# Patient Record
Sex: FEMALE | Race: WHITE
[De-identification: names, ages, dates, MRNs, and addresses within clinical notes are randomized per-mention and may not be internally consistent; named-entity substitution may affect disease eponyms.]

---

## 2019-07-25 NOTE — DIAGNOSTIC IMAGING REPORT
Chest, 1 view,  7/25/2019.   

 



History: Chest pain.



Comparison: None available.



Findings: The cardiomediastinal silhouette and pulmonary vasculature are within

normal limits for a portable exam. There is no focal consolidation or pleural

effusion. Bilateral nipple piercings are present. There are no acute osseous or

soft tissue abnormalities. 



Impression: 

No acute cardiopulmonary abnormality.



Signed by: Loco Montoya on 7/25/2019 2:25 PM

## 2019-07-25 NOTE — XMS REPORT
Clinical Summary

                             Created on: 2019



Tyra Luna

External Reference #: [id number]

: 2000

Sex: Female



Demographics







                          Address                   [address] [place] [id number]

 

                          Home Phone                [phone]

 

                          Preferred Language        English

 

                          Marital Status            Unknown

 

                          Anglican Affiliation     Unknown

 

                          Race                      White

 

                          Ethnic Group              Non-





Author







                          Author                    [facility]

 

                          Address                   Unknown

 

                          Phone                     Unavailable







Support







                Name            Relationship    Address         Phone

 

                Aishwarya Huggins            Unknown         [phone]







Care Team Providers







                    Care Team Member Name    Role                Phone

 

                    Gina Fishman MD    PCP                 [phone]







Allergies

No Known Allergies



Medications







                          End Date                  Status



              Medication     Sig          Dispensed     Refills      Start  



                                         Date  

 

                                                    Active



                 metFORMIN (GLUCOPHAGE)     TK 1 T PO D      1               10/31/201  



                           500 MG tablet             8  

 

                                                    Active



                     spironolactone       2                   10/31/201  



                           (ALDACTONE) 100 MG tablet        8  

 

                                                    Active



                     mirtazapine (REMERON) 15     Take 15 mg by       0   



                           MG tablet                 mouth     



                                         nightly.     

 

                          2019                Discontinued



                 norethindrone (AYGESTIN)     TK 2 TS PO QD      4               10/31/201  



                     5 mg tablet         HS                  8  

 

                          2019                Discontinued



                     escitalopram oxalate     Take 10 mg by       0   



                           (LEXAPRO) 10 MG tablet     mouth as     



                                         needed.     

 

                          2019                Discontinued



              etonogestrel (NEXPLANON)     implanted.     1 each       0              



                           68 mg Impl                8  







Active Problems





No known active problems



Encounters







                          Care Team                 Description



                     Date                Type                Specialty  

 

                                        



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

 Dx)



                     2019          Office Visit        Internal Medicine  

 

                                        



Gina Fishman MD                    Results



                     2018          Telephone           Internal Medicine  

 

                                        



Gina Fishman MD                    Anxiety and depression (Primary Dx)



                     2018          Office Visit        Internal Medicine  



after 2018



Family History







   



                 Medical History     Relation        Name            Comments

 

   



                           Hypertension              Mother  

 

   



                           Heart disease             Sister  

 

   



                           Heart disease             Sister  









   



                 Relation        Name            Status          Comments

 

   



                           Father                     

 

   



                           Mother                    Alive 

 

   



                           Sister                    Alive 

 

   



                                         Sister   







Social History







                                        Date



                 Tobacco Use     Types           Packs/Day       Years Used 

 

                                         



                                         Never Smoker    

 

    



                                         Smokeless Tobacco: Never   



                                         Used   









   



                 Alcohol Use     Drinks/Week     oz/Week         Comments

 

   



                                         Yes   









  



                     Alcohol Habits      Answer              Date Recorded

 

  



                     How often do you have a drink containing alcohol?     Monthly or less     2018



 

  



                     How many drinks containing alcohol do you have on     1 or 2              2018



                                         a typical day when you are drinking?  

 

  



                     How often do you have six or more drinks on one     Less than monthly     2018





                                         occasion?  









 



                           Sex Assigned at Birth     Date Recorded

 

 



                                         Not on file 









                                        Industry



                           Job Start Date            Occupation 

 

                                        Not on file



                           Not on file               Not on file 









                                        Travel End



                           Travel History            Travel Start 

 





                                         No recent travel history available.







Last Filed Vital Signs







                                        Time Taken



                           Vital Sign                Reading 

 

                                        2018  8:58 AM CST



                           Blood Pressure            102/60 

 

                                        2018  8:58 AM CST



                           Pulse                     60 

 

                                        2018  8:58 AM CST



                           Temperature               36.9 C (98.4 F) 

 

                                        2019  9:11 AM CDT



                           Respiratory Rate          16 

 

                                        2018  8:58 AM CST



                           Oxygen Saturation         91% 

 

                                        -



                           Inhaled Oxygen            - 



                                         Concentration  

 

                                        2019  9:11 AM CDT



                           Weight                    49.3 kg (108 lb 11.2 oz) 

 

                                        2019  9:11 AM CDT



                           Height                    160 cm (5' 3") 

 

                                        2019  9:11 AM CDT



                           Body Mass Index           19.26 







Plan of Treatment





Not on file



Procedures







                                        Comments



                 Procedure Name     Priority        Date/Time       Associated Diagnosis 

 

                                        



Results for this procedure are in the results section.



                 TSH             Routine         2018      Anxiety and depression 



                                         9:33 AM CST  



after 2018



Results

* TSH (2018  9:33 AM CST)





   



                 Component       Value           Ref Range       Performed At

 

   



                 TSH             3.390           0.450 - 4.50 uIU/mL     [facility]













                                         Specimen

 





                                         Blood









 



                           Narrative                 Performed At

 

 



                           Performed at:[facility] [address] [facility]     [facility]



                                         [address], [id number] 



                                         : Israel López MD, Phone:[phone] 









   



                 Performing Organization     Address         City/State/Zipcode     Phone Number

 

   



                                         [facility]   

 

   



                                         [facility]   





after 2018



Insurance







     



            Payer      Benefit     Subscriber ID     Type       Phone      Address



                                         Plan /    



                                         Group    

 

     



                 [employer]     [employer] P[employer]       xxxxxxxxxxx     PPO  









     



            Guarantor Name     Account     Relation to     Date of     Phone      Billing Address



                     Type                Patient             Birth  

 

     



            Tyra Luna     Personal/F     Self       2000     [phone]     [address]               (Home)              [place] [address]

## 2019-07-25 NOTE — NUR
PATIENT ELOPED FROM LOBBY. OTHER PATIENT IN LOBBY STATED THAT THIS PATIENT HAD 
LEFT JUST PRIOR TO CALLING FOR PATIENT TO COME INTO TRIAGE.

## 2019-07-25 NOTE — XMS REPORT
Clinical Summary

                             Created on: 2019



Tyra Luna

External Reference #: [id number]

: 2000

Sex: Female



Demographics







                          Address                   [address]  [address]

 

                          Home Phone                [phone]

 

                          Preferred Language        English

 

                          Marital Status            Single

 

                          Jehovah's witness Affiliation     Unknown

 

                          Race                      White

 

                          Ethnic Group              Non-





Author







                          Author                    [facility]

 

                          Organization              [facility]

 

                          Address                   Unknown

 

                          Phone                     Unavailable







Support







                Name            Relationship    Address         Phone

 

                Aishwarya Huggins            Unknown         [phone]







Care Team Providers







                    Care Team Member Name    Role                Phone

 

                    Gina Fishman MD    PCP                 [phone]







Allergies

No Known Allergies



Medications







                          End Date                  Status



              Medication     Sig          Dispensed     Refills      Start  



                                         Date  

 

                          10/16/2018                Discontinued



                     escitalopram (LEXAPRO) 10     Take 10 mg by       0   



                           MG tablet                 mouth daily.     







Active Problems







 



                           Problem                   Noted Date

 

 



                           Endometriosis             10/16/2018







Encounters







                          Care Team                 Description



                     Date                Type                Specialty  

 

                                        



Maida Castro MD               



                     10/16/2018          Anesthesia          Obstetrics and Gynecology  



                                         Event   

 

                                        



Ene Lerma MD                      OPERATIVE LAPAROSCOPY AND HYSTEROSCOPY, CAUTERIZATION OF ENDO,

 ASPIRATION OF OVARIAN CYST



                     10/16/2018          Surgery             Obstetrics and Gynecology  

 

                                        



Ene Lerma MD                       



                     10/16/2018          Hospital            Obstetrics and Gynecology  



                                         Encounter   



after 2018



Social History







                                        Date



                 Tobacco Use     Types           Packs/Day       Years Used 

 

                                         



                                         Never Assessed    









 



                           Sex Assigned at Birth     Date Recorded

 

 



                                         Not on file 









                                        Industry



                           Job Start Date            Occupation 

 

                                        Not on file



                           Not on file               Not on file 









                                        Travel End



                           Travel History            Travel Start 

 





                                         No recent travel history available.







Last Filed Vital Signs







                                        Time Taken



                           Vital Sign                Reading 

 

                                        10/16/2018  3:00 PM CDT



                           Blood Pressure            114/72 

 

                                        10/16/2018  3:00 PM CDT



                           Pulse                     67 

 

                                        10/16/2018  4:00 PM CDT



                           Temperature               37 C (98.6 F) 

 

                                        10/16/2018  4:00 PM CDT



                           Respiratory Rate          15 

 

                                        10/16/2018  3:00 PM CDT



                           Oxygen Saturation         100% 

 

                                        -



                           Inhaled Oxygen            - 



                                         Concentration  

 

                                        10/16/2018 10:00 AM CDT



                           Weight                    54.6 kg (120 lb 4.8 oz) 

 

                                        10/16/2018 10:00 AM CDT



                           Height                    160 cm (5' 3") 

 

                                        10/16/2018 10:00 AM CDT



                           Body Mass Index           21.31 







Plan of Treatment





Not on file



Procedures







                                        Comments



                 Procedure Name     Priority        Date/Time       Associated Diagnosis 

 

                                         



                     NH AN ELECTIVE      Routine             10/16/2018  



                           ENDOTRACHEAL AIRWAY       1:05 PM CDT  

 

  



                                         Procedure



                                         Note -



                                         Leatha Holcomb,



                                         CRNA -



                                         10/16/2018



                                         1:05 PM



                                         CDT



                                         Airway



                                         Date/Time:



                                         10/16/2018



                                         12:42 PM



                                         Performed



                                         by:



                                         LEATHA HOLCOMB



                                         Authorized



                                         by:



                                         MAIDA CASTRO





                                         Location:



                                         OR



                                         Urgency:



                                         Elective



                                         Difficult



                                         Airway: No



                                         Performed



                                         by:



                                         resident/C



                                         RNA/AA



                                         Preoxygena



                                         marla with



                                         100% O2:



                                         Yes



                                         C-spine



                                         Precaution



                                         s



                                         Maintained



                                         Throughout



                                         : Yes



                                         Mask



                                         Ventilatio



                                         n:  Easy



                                         mask



                                         Final



                                         Airway



                                         Type:



                                         Endotrache



                                         al airway



                                         Final



                                         Endotrache



                                         al Airway:



                                         ETT



                                         Technique



                                         Used:



                                         Direct



                                         laryngosco



                                         py



                                         Devices/Me



                                         thods Used



                                         in



                                         Placement:



                                         Intubatin



                                         g stylet



                                         Insertion



                                         Site:



                                         Oral



                                         Blade



                                         Type:



                                         [place]



                                         Laryngosco



                                         pe



                                         Blade/Vide



                                         olaryngosc



                                         ope Blade



                                         Size:  2



                                         ETT Size



                                         (mm):  7.0



                                         Measured



                                         from:



                                         Lips



                                         ETT to



                                         Lips (cm):



                                         21



                                         Placement



                                         Verified



                                         by: CO2



                                         detection,



                                         direct



                                         visualizat



                                         ion and



                                         equal



                                         breath



                                         sounds



                                         Laryngosco



                                         pic view:



                                         Grade I -



                                         full view



                                         of glottis



                                         Rapid



                                         Sequence



                                         Induction



                                         (RSI): No





                                         Modified



                                         RSI: No



                                         Number of



                                         Attempts



                                         at



                                         Approach:



                                         1



 

                                         



                     LAPAROSCOPY, DIAGNOSTIC      10/16/2018          Pelvic pain in female 



                                         12:33 PM CDT  

 

  



                                         Case Notes



                                         REQ 1200



                                         START

 

  



                                         Special



                                         Needs



                                         REQ 1200



                                         START

 

                                        



Results for this procedure are in the results section.



                     ESTIMATED GFR       STAT                10/16/2018  



                                         11:05 AM CDT  

 

                                        



Results for this procedure are in the results section.



                     TYPE AND SCREEN     STAT                10/16/2018  



                                         11:05 AM CDT  

 

                                        



Results for this procedure are in the results section.



                     BASIC METABOLIC PANEL     STAT                10/16/2018  



                                         11:05 AM CDT  

 

                                        



Results for this procedure are in the results section.



                     HC COMPLETE BLD COUNT     STAT                10/16/2018  



                           W/AUTO DIFF               11:05 AM CDT  

 

                                        



Results for this procedure are in the results section.



                     GFR CALCULATION     STAT                10/16/2018  



                                         10:48 AM CDT  



after 2018



Results

* Estimated GFR (10/16/2018 11:05 AM CDT)





    



              Component     Value        Ref Range     Performed At     Pathologist



                                         Signature

 

    



                 Estimated GFR     >=90            mL/min/1.73 m2     [facility] DEPARTMENT 



                           Comment:                  OF PATHOLOGY 



                           CatergoryUnitsInte      AND GENOMIC 



                           rpretation                MEDICINE 



                                         G1   



                                         >=90 Normal or high   



                                         G2   



                                         60-89Mildly decreased   



                                         V1f82-91   



                                         Mildly to moderately   



                                         decreased   



                                         [id number]   



                                         Moderately to severely   



                                         decreased   



                                         G4   



                                         15-29Severely   



                                         decreased   



                                         G5   



                                         <15Kidney failure   



                                         The eGFR was calculated using   



                                         the Chronic Kidney Disease   



                                         Epidemiology Collaboration   



                                         (CKD-EPI) equation.   



                                         Interpretation is based on   



                                         recommendations of the   



                                         National Kidney   



                                         Foundation-Kidney Disease   



                                         Outcomes Quality   



                                         Initiative (NKF-KDOQI)   



                                         published in 2014.   













                                         Specimen

 





                                         Plasma specimen









   



                 Performing Organization     Address         City/State/Zipcode     Phone Number

 

   



                     [facility] DEPARTMENT OF     [address] [place] [address] 



                                         PATHOLOGY AND GENOMIC   



                                         MEDICINE   





* CBC with platelet and differential (10/16/2018 11:05 AM CDT)





    



              Component     Value        Ref Range     Performed At     Pathologist



                                         Signature

 

    



                 WBC             5.05            4.50 - 11.00 k/uL     [facility] DEPARTMENT 



                                         OF PATHOLOGY 



                                         AND GENOMIC 



                                         MEDICINE 

 

    



                 RBC             4.69            4.20 - 5.50 m/uL     [facility] DEPARTMENT 



                                         OF PATHOLOGY 



                                         AND GENOMIC 



                                         MEDICINE 

 

    



                 HGB             13.7            12.0 - 16.0 g/dL     [facility] DEPARTMENT 



                                         OF PATHOLOGY 



                                         AND GENOMIC 



                                         MEDICINE 

 

    



                 HCT             39.9            37.0 - 47.0 %     [facility] DEPARTMENT 



                                         OF PATHOLOGY 



                                         AND GENOMIC 



                                         MEDICINE 

 

    



                 MCV             85.1            82.0 - 100.0 fL     [facility] DEPARTMENT 



                                         OF PATHOLOGY 



                                         AND GENOMIC 



                                         MEDICINE 

 

    



                 MCH             29.2            27.0 - 34.0 pg     [facility] DEPARTMENT 



                                         OF PATHOLOGY 



                                         AND GENOMIC 



                                         MEDICINE 

 

    



                 MCHC            34.3            31.0 - 37.0 g/dL     [facility] DEPARTMENT 



                                         OF PATHOLOGY 



                                         AND GENOMIC 



                                         MEDICINE 

 

    



                 RDW - SD        37.3            37.0 - 55.0 fL     [facility] DEPARTMENT 



                                         OF PATHOLOGY 



                                         AND GENOMIC 



                                         MEDICINE 

 

    



                 MPV             11.4            8.8 - 13.2 fL     [facility] DEPARTMENT 



                                         OF PATHOLOGY 



                                         AND GENOMIC 



                                         MEDICINE 

 

    



                 Platelet count     169             150 - 400 k/uL     [facility] DEPARTMENT 



                                         OF PATHOLOGY 



                                         AND GENOMIC 



                                         MEDICINE 

 

    



                 Nucleated RBC     0.00            /100 WBC        [facility] DEPARTMENT 



                                         OF PATHOLOGY 



                                         AND GENOMIC 



                                         MEDICINE 

 

    



                 Neutrophils     46.0            39.0 - 69.0 %     [facility] DEPARTMENT 



                                         OF PATHOLOGY 



                                         AND GENOMIC 



                                         MEDICINE 

 

    



                 Lymphocytes     46.3 (H)        25.0 - 45.0 %     [facility] DEPARTMENT 



                                         OF PATHOLOGY 



                                         AND GENOMIC 



                                         MEDICINE 

 

    



                 Monocytes       6.1             0.0 - 10.0 %     [facility] DEPARTMENT 



                                         OF PATHOLOGY 



                                         AND GENOMIC 



                                         MEDICINE 

 

    



                 Eosinophils     1.0             0.0 - 5.0 %     [facility] DEPARTMENT 



                                         OF PATHOLOGY 



                                         AND GENOMIC 



                                         MEDICINE 

 

    



                 Basophils       0.4             0.0 - 1.0 %     [facility] DEPARTMENT 



                                         OF PATHOLOGY 



                                         AND GENOMIC 



                                         MEDICINE 

 

    



                 Immature        0.2Comment: "Immature     0.0 - 1.0 %     [facility] DEPARTMENT 



                     granulocytes        granulocytes"  (promyelocytes,      OF PATHOLOGY 



                           myelocytes, metamyelocytes)      AND GENOMIC 



                                         MEDICINE 













                                         Specimen

 





                                         Blood









   



                 Performing Organization     Address         City/[facility]/Zipcode     Phone Number

 

   



                     [facility] [place] [address] 



                                         PATHOLOGY AND GENOMIC   



                                         MEDICINE   





* Type and screen (10/16/2018 11:05 AM CDT)





    



              Component     Value        Ref Range     Performed At     Pathologist



                                         Signature

 

    



                     ABO grouping        A                   [facility] DEPARTMENT 



                                         OF PATHOLOGY 



                                         AND GENOMIC 



                                         MEDICINE 

 

    



                     Rh type             NEG                 [facility] DEPARTMENT 



                                         OF PATHOLOGY 



                                         AND GENOMIC 



                                         MEDICINE 

 

    



                     Antibody screen     NEG                 [facility] DEPARTMENT 



                           (gel)                     OF PATHOLOGY 



                                         AND GENOMIC 



                                         MEDICINE 













                                         Specimen

 





                                         Blood









   



                 Performing Organization     Address         City/[facility]/Zipcode     Phone Number

 

   



                     [facility] [place] [address] 



                                         PATHOLOGY AND [facility]   



                                         MEDICINE   





* Basic metabolic panel (10/16/2018 11:05 AM CDT)





    



              Component     Value        Ref Range     Performed At     Pathologist



                                         Signature

 

    



                 Sodium          139             135 - 148 mEq/L     [facility] DEPARTMENT 



                                         OF PATHOLOGY 



                                         AND GENOMIC 



                                         MEDICINE 

 

    



                 Potassium       4.2             3.5 - 5.0 mEq/L     [facility] DEPARTMENT 



                                         OF PATHOLOGY 



                                         AND GENOMIC 



                                         MEDICINE 

 

    



                 Chloride        107             98 - 112 mEq/L     [facility] DEPARTMENT 



                                         OF PATHOLOGY 



                                         AND GENOMIC 



                                         MEDICINE 

 

    



                 CO2             22 (L)          24 - 31 mEq/L     [facility] DEPARTMENT 



                                         OF PATHOLOGY 



                                         AND GENOMIC 



                                         MEDICINE 

 

    



                 Anion gap       10@ANIO         7 - 15 mEq/L     [facility] DEPARTMENT 



                                         OF PATHOLOGY 



                                         AND GENOMIC 



                                         MEDICINE 

 

    



                 BUN             7               6 - 20 mg/dL     [facility] [facility] DEPARTMENT 



                                         OF PATHOLOGY 



                                         AND GENOMIC 



                                         MEDICINE 

 

    



                 Creatinine      0.69            0.50 - 0.90 mg/dL     [facility] DEPARTMENT 



                                         OF PATHOLOGY 



                                         AND GENOMIC 



                                         MEDICINE 

 

    



                 Glucose         94              65 - 99 mg/dL     [facility] DEPARTMENT 



                                         OF PATHOLOGY 



                                         AND GENOMIC 



                                         MEDICINE 

 

    



                 Calcium         9.7             8.3 - 10.2 mg/dL     [facility] DEPARTMENT 



                                         OF PATHOLOGY 



                                         AND GENOMIC 



                                         MEDICINE 













                                         Specimen

 





                                         Plasma specimen









   



                 Performing Organization     Address         City[facility]     Phone Number

 

   



                     [facility] [place] [address] 



                                         PATHOLOGY AND GENOMIC   



                                         MEDICINE   





* GFR calculation (10/16/2018 10:48 AM CDT)





    



              Component     Value        Ref Range     Performed At     Pathologist



                                         Signature

 

    



                     GFR calculation     See BelowComment: GFR not      [facility] DEPARTMENT 



                           valid on patients less than 18      OF PATHOLOGY 



                           years of age.             AND GENOMIC 



                                         MEDICINE 













                                         Specimen

 





                                         Plasma specimen









   



                 Performing Organization     Address         City/[facility]/Zipcode     Phone Number

 

   



                     [facility] [place] [address] 



                                         PATHOLOGY AND GENOMIC   



                                         MEDICINE   





after 2018



Insurance







                                        Type



            Payer      Benefit     Subscriber ID     Effective     Phone      Address 



                           Plan /                    Dates   



                                         Group     

 

                                        [facility]



                 [place]GIE SERRANO OPEN      xxxxxxxxxxx     2018-P   



                           ACCESS/NET                resent   



                                         WORK     









     



            Guarantor Name     Account     Relation to     Date of     Phone      Billing Address



                     Type                Patient             Birth  

 

     



            Tyra Luna     Personal/F     Self       2000     [phone] [id number]               ([place])              [place] [address]







Advance Directives





Patient has advance care planning documents on file. For more information, lauren anglin contact:



Aguila Brito



[address] [place] [address]

## 2019-07-25 NOTE — NUR
PT MOTHER REQUESTING TO SPEAK WITH PATIENT RELATIONS FOR CONCERNS THAT PT WAS 
NOT SEEN BY A PHYSICIAN DURING THE VISIT AND NOT GIVEN A ROOM; STATES "I SEE 
Y'ALL HAVE OPEN ROOMS IN THE BACK, AND A DOCTOR DID NOT SEE HER", MOTHER 
REASSURED THAT MUNA GARCIA SAW PT DURING TRIAGE WHILE EKG WAS PERFORMED, 
INFORMED THAT PT HAS BEEN SEEN BASED ON ACUITY AND INTERVENTIONS AS RECOMMENED 
BY ENP HAVE BEEN IN PLACE, FURTHER INFORMED THAT PT IS READY FOR D/C AS HER 
CONDITION IS STABLE BASED ON DIAGNOSTICS ORDERED, MOTHER STATES "YOU CAN'T 
DISCHARGE HER WITHOUT A DOCTOR HAVING SEEN HER, ONLY NURSES SAW HER", ATTEMPTED 
TO REASSURE MOTHER AND DAUGHTER MULTIPLE TIMES, BUT UNABLE TO RESOLVE CONCERNS 
AT THIS TIME, INFORMED THAT MANAGER WOULD BE CALLED TO FURTHER EVALUATE THE 
CONCERNS, VERBALIZED UNDERSTANDING.

## 2022-06-23 ENCOUNTER — HOSPITAL ENCOUNTER (EMERGENCY)
Dept: HOSPITAL 88 - ER | Age: 22
Discharge: HOME | End: 2022-06-23
Payer: COMMERCIAL

## 2022-06-23 VITALS — SYSTOLIC BLOOD PRESSURE: 100 MMHG | DIASTOLIC BLOOD PRESSURE: 56 MMHG

## 2022-06-23 VITALS — BODY MASS INDEX: 16.83 KG/M2 | WEIGHT: 95 LBS | HEIGHT: 63 IN

## 2022-06-23 DIAGNOSIS — G83.84: ICD-10-CM

## 2022-06-23 DIAGNOSIS — R50.9: ICD-10-CM

## 2022-06-23 DIAGNOSIS — G51.0: Primary | ICD-10-CM

## 2022-06-23 DIAGNOSIS — F90.9: ICD-10-CM

## 2022-06-23 DIAGNOSIS — F41.9: ICD-10-CM

## 2022-06-23 DIAGNOSIS — G40.909: ICD-10-CM

## 2022-06-23 LAB
ALBUMIN SERPL-MCNC: 3.9 G/DL (ref 3.5–5)
ALBUMIN/GLOB SERPL: 1.2 {RATIO} (ref 0.8–2)
ALP SERPL-CCNC: 68 IU/L (ref 40–150)
ALT SERPL-CCNC: 12 IU/L (ref 0–55)
ANION GAP SERPL CALC-SCNC: 14.9 MMOL/L (ref 8–16)
BACTERIA URNS QL MICRO: (no result) /HPF
BASOPHILS # BLD AUTO: 0 10*3/UL (ref 0–0.1)
BASOPHILS NFR BLD AUTO: 0.3 % (ref 0–1)
BUN SERPL-MCNC: 10 MG/DL (ref 7–26)
BUN/CREAT SERPL: 11 (ref 6–25)
CALCIUM SERPL-MCNC: 9 MG/DL (ref 8.4–10.2)
CHLORIDE SERPL-SCNC: 111 MMOL/L (ref 98–107)
CLARITY UR: (no result)
CO2 SERPL-SCNC: 19 MMOL/L (ref 22–29)
COLOR UR: YELLOW
DEPRECATED NEUTROPHILS # BLD AUTO: 5.4 10*3/UL (ref 2.1–6.9)
DEPRECATED RBC URNS MANUAL-ACNC: (no result) /HPF (ref 0–5)
EOSINOPHIL # BLD AUTO: 0 10*3/UL (ref 0–0.4)
EOSINOPHIL NFR BLD AUTO: 0.4 % (ref 0–6)
EPI CELLS URNS QL MICRO: (no result) /LPF
ERYTHROCYTE [DISTWIDTH] IN CORD BLOOD: 15.2 % (ref 11.7–14.4)
GLOBULIN PLAS-MCNC: 3.3 G/DL (ref 2.3–3.5)
GLUCOSE SERPLBLD-MCNC: 99 MG/DL (ref 74–118)
HCT VFR BLD AUTO: 36.7 % (ref 34.2–44.1)
HGB BLD-MCNC: 11.8 G/DL (ref 12–16)
KETONES UR QL STRIP.AUTO: NEGATIVE
LEUKOCYTE ESTERASE UR QL STRIP.AUTO: NEGATIVE
LYMPHOCYTES # BLD: 1.5 10*3/UL (ref 1–3.2)
LYMPHOCYTES NFR BLD AUTO: 19.3 % (ref 18–39.1)
MCH RBC QN AUTO: 27 PG (ref 28–32)
MCHC RBC AUTO-ENTMCNC: 32.2 G/DL (ref 31–35)
MCV RBC AUTO: 84 FL (ref 81–99)
MONOCYTES # BLD AUTO: 0.6 10*3/UL (ref 0.2–0.8)
MONOCYTES NFR BLD AUTO: 8.3 % (ref 4.4–11.3)
MUCOUS THREADS URNS QL MICRO: (no result)
NEUTS SEG NFR BLD AUTO: 71.4 % (ref 38.7–80)
NITRITE UR QL STRIP.AUTO: NEGATIVE
PLATELET # BLD AUTO: 189 X10E3/UL (ref 140–360)
POTASSIUM SERPL-SCNC: 3.9 MMOL/L (ref 3.5–5.1)
PROT UR QL STRIP.AUTO: NEGATIVE
RBC # BLD AUTO: 4.37 X10E6/UL (ref 3.6–5.1)
SODIUM SERPL-SCNC: 141 MMOL/L (ref 136–145)
SP GR UR STRIP: 1.02 (ref 1.01–1.02)
UROBILINOGEN UR STRIP-MCNC: 0.2 MG/DL (ref 0.2–1)
WBC #/AREA URNS HPF: (no result) /HPF (ref 0–5)

## 2022-06-23 PROCEDURE — 87070 CULTURE OTHR SPECIMN AEROBIC: CPT

## 2022-06-23 PROCEDURE — 85025 COMPLETE CBC W/AUTO DIFF WBC: CPT

## 2022-06-23 PROCEDURE — 36415 COLL VENOUS BLD VENIPUNCTURE: CPT

## 2022-06-23 PROCEDURE — 93005 ELECTROCARDIOGRAM TRACING: CPT

## 2022-06-23 PROCEDURE — 81025 URINE PREGNANCY TEST: CPT

## 2022-06-23 PROCEDURE — 83518 IMMUNOASSAY DIPSTICK: CPT

## 2022-06-23 PROCEDURE — 80053 COMPREHEN METABOLIC PANEL: CPT

## 2022-06-23 PROCEDURE — 99284 EMERGENCY DEPT VISIT MOD MDM: CPT

## 2022-06-23 PROCEDURE — 70450 CT HEAD/BRAIN W/O DYE: CPT

## 2022-06-23 PROCEDURE — 81001 URINALYSIS AUTO W/SCOPE: CPT
